# Patient Record
Sex: MALE | Race: WHITE | NOT HISPANIC OR LATINO | Employment: OTHER | URBAN - METROPOLITAN AREA
[De-identification: names, ages, dates, MRNs, and addresses within clinical notes are randomized per-mention and may not be internally consistent; named-entity substitution may affect disease eponyms.]

---

## 2017-09-05 ENCOUNTER — LAB VISIT (OUTPATIENT)
Dept: LAB | Facility: HOSPITAL | Age: 57
End: 2017-09-05
Attending: INTERNAL MEDICINE
Payer: MEDICARE

## 2017-09-05 DIAGNOSIS — M10.9 GOUT, UNSPECIFIED: ICD-10-CM

## 2017-09-05 DIAGNOSIS — E78.00 PURE HYPERCHOLESTEROLEMIA: Primary | ICD-10-CM

## 2017-09-05 DIAGNOSIS — I10 ESSENTIAL HYPERTENSION, MALIGNANT: ICD-10-CM

## 2017-09-05 LAB
ALBUMIN SERPL BCP-MCNC: 3.8 G/DL
ALP SERPL-CCNC: 53 U/L
ALT SERPL W/O P-5'-P-CCNC: 44 U/L
ANION GAP SERPL CALC-SCNC: 7 MMOL/L
AST SERPL-CCNC: 33 U/L
BILIRUB DIRECT SERPL-MCNC: 0.3 MG/DL
BILIRUB SERPL-MCNC: 0.8 MG/DL
BUN SERPL-MCNC: 14 MG/DL
CALCIUM SERPL-MCNC: 9.5 MG/DL
CHLORIDE SERPL-SCNC: 107 MMOL/L
CHOLEST SERPL-MCNC: 162 MG/DL
CHOLEST/HDLC SERPL: 3.1 {RATIO}
CO2 SERPL-SCNC: 26 MMOL/L
CREAT SERPL-MCNC: 1 MG/DL
EST. GFR  (AFRICAN AMERICAN): >60 ML/MIN/1.73 M^2
EST. GFR  (NON AFRICAN AMERICAN): >60 ML/MIN/1.73 M^2
GLUCOSE SERPL-MCNC: 102 MG/DL
HDLC SERPL-MCNC: 52 MG/DL
HDLC SERPL: 32.1 %
LDLC SERPL CALC-MCNC: 71.8 MG/DL
NONHDLC SERPL-MCNC: 110 MG/DL
POTASSIUM SERPL-SCNC: 4.1 MMOL/L
PROT SERPL-MCNC: 7 G/DL
SODIUM SERPL-SCNC: 140 MMOL/L
TRIGL SERPL-MCNC: 191 MG/DL
URATE SERPL-MCNC: 4.7 MG/DL

## 2017-09-05 PROCEDURE — 36415 COLL VENOUS BLD VENIPUNCTURE: CPT

## 2017-09-05 PROCEDURE — 80061 LIPID PANEL: CPT

## 2017-09-05 PROCEDURE — 80048 BASIC METABOLIC PNL TOTAL CA: CPT

## 2017-09-05 PROCEDURE — 84550 ASSAY OF BLOOD/URIC ACID: CPT

## 2017-09-05 PROCEDURE — 80076 HEPATIC FUNCTION PANEL: CPT

## 2017-11-27 ENCOUNTER — LAB VISIT (OUTPATIENT)
Dept: LAB | Facility: HOSPITAL | Age: 57
End: 2017-11-27
Attending: INTERNAL MEDICINE
Payer: MEDICARE

## 2017-11-27 DIAGNOSIS — E78.00 PURE HYPERCHOLESTEROLEMIA: ICD-10-CM

## 2017-11-27 DIAGNOSIS — I10 ESSENTIAL HYPERTENSION, MALIGNANT: Primary | ICD-10-CM

## 2017-11-27 LAB
ANION GAP SERPL CALC-SCNC: 7 MMOL/L
BUN SERPL-MCNC: 15 MG/DL
CALCIUM SERPL-MCNC: 9.4 MG/DL
CHLORIDE SERPL-SCNC: 106 MMOL/L
CO2 SERPL-SCNC: 26 MMOL/L
CREAT SERPL-MCNC: 1 MG/DL
EST. GFR  (AFRICAN AMERICAN): >60 ML/MIN/1.73 M^2
EST. GFR  (NON AFRICAN AMERICAN): >60 ML/MIN/1.73 M^2
GLUCOSE SERPL-MCNC: 101 MG/DL
POTASSIUM SERPL-SCNC: 4 MMOL/L
SODIUM SERPL-SCNC: 139 MMOL/L

## 2017-11-27 PROCEDURE — 36415 COLL VENOUS BLD VENIPUNCTURE: CPT

## 2017-11-27 PROCEDURE — 80048 BASIC METABOLIC PNL TOTAL CA: CPT

## 2017-11-28 ENCOUNTER — OFFICE VISIT (OUTPATIENT)
Dept: OPTOMETRY | Facility: CLINIC | Age: 57
End: 2017-11-28
Payer: MEDICARE

## 2017-11-28 DIAGNOSIS — H52.203 MYOPIA WITH ASTIGMATISM AND PRESBYOPIA, BILATERAL: ICD-10-CM

## 2017-11-28 DIAGNOSIS — H52.4 MYOPIA WITH ASTIGMATISM AND PRESBYOPIA, BILATERAL: ICD-10-CM

## 2017-11-28 DIAGNOSIS — Z01.00 EYE EXAM, ROUTINE: Primary | ICD-10-CM

## 2017-11-28 DIAGNOSIS — H52.13 MYOPIA WITH ASTIGMATISM AND PRESBYOPIA, BILATERAL: ICD-10-CM

## 2017-11-28 DIAGNOSIS — H25.13 NUCLEAR SCLEROSIS, BILATERAL: ICD-10-CM

## 2017-11-28 PROCEDURE — 99999 PR PBB SHADOW E&M-EST. PATIENT-LVL II: CPT | Mod: PBBFAC,,, | Performed by: OPTOMETRIST

## 2017-11-28 PROCEDURE — 92014 COMPRE OPH EXAM EST PT 1/>: CPT | Mod: S$GLB,,, | Performed by: OPTOMETRIST

## 2017-11-28 PROCEDURE — 92015 DETERMINE REFRACTIVE STATE: CPT | Mod: S$GLB,,, | Performed by: OPTOMETRIST

## 2017-11-28 NOTE — PROGRESS NOTES
HPI      is here for annual eye exam. Pt sts change in vision dist/near.      (-)Flashes (-)Floaters  (-)Itch, (+)tear, (-)burn, (-)Dryness. (-) OTC Drops   (-)Photophobia  (-)Glare (-)diplopia (-) headaches          Last edited by Gal Walker, PCT on 11/28/2017  9:02 AM. (History)            Assessment /Plan     For exam results, see Encounter Report.    Eye exam, routine  -annual [facility] exam    Myopia with astigmatism and presbyopia, bilateral  Eyeglass Final Rx     Eyeglass Final Rx       Sphere Cylinder Axis Dist VA Add    Right -1.25 +1.25 160 20/20 +2.25    Left -0.75 +0.50 180 20/20 +2.25    Type:  SVL-Distance/Near    Expiration Date:  11/29/2018                Nuclear sclerosis, bilateral  -Educated patient on presence of cataracts at today's exam, monitor at annual dilated fundus exam. 8+ years surgical estimate.      RTC 1 yr

## 2018-03-21 ENCOUNTER — PES CALL (OUTPATIENT)
Dept: ADMINISTRATIVE | Facility: CLINIC | Age: 58
End: 2018-03-21

## 2018-05-21 ENCOUNTER — LAB VISIT (OUTPATIENT)
Dept: LAB | Facility: HOSPITAL | Age: 58
End: 2018-05-21
Attending: INTERNAL MEDICINE
Payer: MEDICARE

## 2018-05-21 DIAGNOSIS — E78.00 PURE HYPERCHOLESTEROLEMIA: ICD-10-CM

## 2018-05-21 DIAGNOSIS — I10 ESSENTIAL HYPERTENSION, MALIGNANT: Primary | ICD-10-CM

## 2018-05-21 LAB
ALBUMIN SERPL BCP-MCNC: 3.8 G/DL
ALP SERPL-CCNC: 55 U/L
ALT SERPL W/O P-5'-P-CCNC: 29 U/L
ANION GAP SERPL CALC-SCNC: 8 MMOL/L
AST SERPL-CCNC: 27 U/L
BILIRUB DIRECT SERPL-MCNC: 0.2 MG/DL
BILIRUB SERPL-MCNC: 0.6 MG/DL
BUN SERPL-MCNC: 18 MG/DL
CALCIUM SERPL-MCNC: 9.3 MG/DL
CHLORIDE SERPL-SCNC: 105 MMOL/L
CHOLEST SERPL-MCNC: 146 MG/DL
CHOLEST/HDLC SERPL: 3.1 {RATIO}
CO2 SERPL-SCNC: 25 MMOL/L
CREAT SERPL-MCNC: 1.1 MG/DL
EST. GFR  (AFRICAN AMERICAN): >60 ML/MIN/1.73 M^2
EST. GFR  (NON AFRICAN AMERICAN): >60 ML/MIN/1.73 M^2
GLUCOSE SERPL-MCNC: 115 MG/DL
HDLC SERPL-MCNC: 47 MG/DL
HDLC SERPL: 32.2 %
LDLC SERPL CALC-MCNC: 71.8 MG/DL
NONHDLC SERPL-MCNC: 99 MG/DL
POTASSIUM SERPL-SCNC: 4.4 MMOL/L
PROT SERPL-MCNC: 6.5 G/DL
SODIUM SERPL-SCNC: 138 MMOL/L
TRIGL SERPL-MCNC: 136 MG/DL
URATE SERPL-MCNC: 6.5 MG/DL

## 2018-05-21 PROCEDURE — 36415 COLL VENOUS BLD VENIPUNCTURE: CPT

## 2018-05-21 PROCEDURE — 84550 ASSAY OF BLOOD/URIC ACID: CPT

## 2018-05-21 PROCEDURE — 80048 BASIC METABOLIC PNL TOTAL CA: CPT

## 2018-05-21 PROCEDURE — 80076 HEPATIC FUNCTION PANEL: CPT

## 2018-05-21 PROCEDURE — 80061 LIPID PANEL: CPT

## 2018-11-23 RX ORDER — PROMETHAZINE HYDROCHLORIDE AND CODEINE PHOSPHATE 6.25; 1 MG/5ML; MG/5ML
5 SOLUTION ORAL EVERY 4 HOURS PRN
Qty: 30 ML | Refills: 0 | Status: SHIPPED | OUTPATIENT
Start: 2018-11-23 | End: 2018-11-23 | Stop reason: SDUPTHER

## 2018-11-23 RX ORDER — PROMETHAZINE HYDROCHLORIDE AND CODEINE PHOSPHATE 6.25; 1 MG/5ML; MG/5ML
5 SOLUTION ORAL EVERY 4 HOURS PRN
Qty: 30 ML | Refills: 0 | Status: SHIPPED | OUTPATIENT
Start: 2018-11-23 | End: 2018-12-03

## 2018-12-04 ENCOUNTER — OFFICE VISIT (OUTPATIENT)
Dept: OPTOMETRY | Facility: CLINIC | Age: 58
End: 2018-12-04
Payer: MEDICARE

## 2018-12-04 DIAGNOSIS — H52.4 MYOPIA WITH ASTIGMATISM AND PRESBYOPIA, BILATERAL: ICD-10-CM

## 2018-12-04 DIAGNOSIS — H52.203 MYOPIA WITH ASTIGMATISM AND PRESBYOPIA, BILATERAL: ICD-10-CM

## 2018-12-04 DIAGNOSIS — H52.13 MYOPIA WITH ASTIGMATISM AND PRESBYOPIA, BILATERAL: ICD-10-CM

## 2018-12-04 DIAGNOSIS — Z01.00 EYE EXAM, ROUTINE: Primary | ICD-10-CM

## 2018-12-04 PROCEDURE — 92014 COMPRE OPH EXAM EST PT 1/>: CPT | Mod: S$GLB,,, | Performed by: OPTOMETRIST

## 2018-12-04 PROCEDURE — 99999 PR PBB SHADOW E&M-EST. PATIENT-LVL II: CPT | Mod: PBBFAC,,, | Performed by: OPTOMETRIST

## 2018-12-04 PROCEDURE — 92015 DETERMINE REFRACTIVE STATE: CPT | Mod: S$GLB,,, | Performed by: OPTOMETRIST

## 2018-12-04 RX ORDER — OLMESARTAN MEDOXOMIL 40 MG/1
TABLET ORAL
COMMUNITY
Start: 2018-10-09

## 2018-12-04 RX ORDER — ALLOPURINOL 300 MG/1
TABLET ORAL
COMMUNITY
Start: 2018-10-17

## 2018-12-04 NOTE — PROGRESS NOTES
HPI     DSL - 11/28/17     59 y/o male is here for routine eye exam. Pt c/o of blurry distance. Pt   denies floaters, flashes, and eye allergies. Glare is a slight bother. Pt   did not get last MRX filled.     Eyemeds  No gtts    Last edited by Carola Arias on 12/4/2018  9:38 AM. (History)            Assessment /Plan     For exam results, see Encounter Report.    Eye exam, routine  -[facility]    Myopia with astigmatism and presbyopia, bilateral  Eyeglass Final Rx     Eyeglass Final Rx       Sphere Cylinder Axis Dist VA Add    Right -1.25 +1.25 160 20/20 +2.00    Left -0.50 +0.75 005 20/20 +2.00    Type:  SVL    Expiration Date:  12/5/2019                  RTC 1 yr

## 2019-02-21 ENCOUNTER — LAB VISIT (OUTPATIENT)
Dept: LAB | Facility: HOSPITAL | Age: 59
End: 2019-02-21
Attending: INTERNAL MEDICINE
Payer: MEDICARE

## 2019-02-21 DIAGNOSIS — M10.9 GOUT, UNSPECIFIED: ICD-10-CM

## 2019-02-21 DIAGNOSIS — E78.00 PURE HYPERCHOLESTEROLEMIA: ICD-10-CM

## 2019-02-21 DIAGNOSIS — I10 ESSENTIAL HYPERTENSION, MALIGNANT: Primary | ICD-10-CM

## 2019-02-21 LAB
ALBUMIN SERPL BCP-MCNC: 4 G/DL
ALP SERPL-CCNC: 64 U/L
ALT SERPL W/O P-5'-P-CCNC: 70 U/L
ANION GAP SERPL CALC-SCNC: 7 MMOL/L
AST SERPL-CCNC: 49 U/L
BILIRUB DIRECT SERPL-MCNC: 0.3 MG/DL
BILIRUB SERPL-MCNC: 0.6 MG/DL
BUN SERPL-MCNC: 14 MG/DL
CALCIUM SERPL-MCNC: 9.9 MG/DL
CHLORIDE SERPL-SCNC: 106 MMOL/L
CHOLEST SERPL-MCNC: 167 MG/DL
CHOLEST/HDLC SERPL: 3.5 {RATIO}
CO2 SERPL-SCNC: 26 MMOL/L
CREAT SERPL-MCNC: 1 MG/DL
EST. GFR  (AFRICAN AMERICAN): >60 ML/MIN/1.73 M^2
EST. GFR  (NON AFRICAN AMERICAN): >60 ML/MIN/1.73 M^2
GLUCOSE SERPL-MCNC: 131 MG/DL
HDLC SERPL-MCNC: 48 MG/DL
HDLC SERPL: 28.7 %
LDLC SERPL CALC-MCNC: 75.4 MG/DL
NONHDLC SERPL-MCNC: 119 MG/DL
POTASSIUM SERPL-SCNC: 4.3 MMOL/L
PROT SERPL-MCNC: 6.9 G/DL
SODIUM SERPL-SCNC: 139 MMOL/L
TRIGL SERPL-MCNC: 218 MG/DL
URATE SERPL-MCNC: 5.9 MG/DL

## 2019-02-21 PROCEDURE — 84550 ASSAY OF BLOOD/URIC ACID: CPT

## 2019-02-21 PROCEDURE — 80061 LIPID PANEL: CPT

## 2019-02-21 PROCEDURE — 80076 HEPATIC FUNCTION PANEL: CPT

## 2019-02-21 PROCEDURE — 36415 COLL VENOUS BLD VENIPUNCTURE: CPT

## 2019-02-21 PROCEDURE — 80048 BASIC METABOLIC PNL TOTAL CA: CPT

## 2019-06-18 ENCOUNTER — PES CALL (OUTPATIENT)
Dept: ADMINISTRATIVE | Facility: CLINIC | Age: 59
End: 2019-06-18

## 2019-11-05 ENCOUNTER — LAB VISIT (OUTPATIENT)
Dept: LAB | Facility: HOSPITAL | Age: 59
End: 2019-11-05
Attending: INTERNAL MEDICINE
Payer: MEDICARE

## 2019-11-05 DIAGNOSIS — M10.00 GOUTY NEURITIS: ICD-10-CM

## 2019-11-05 DIAGNOSIS — G63 GOUTY NEURITIS: ICD-10-CM

## 2019-11-05 DIAGNOSIS — E78.00 PURE HYPERCHOLESTEROLEMIA: Primary | ICD-10-CM

## 2019-11-05 LAB
ALBUMIN SERPL BCP-MCNC: 4.3 G/DL (ref 3.5–5.2)
ALP SERPL-CCNC: 60 U/L (ref 55–135)
ALT SERPL W/O P-5'-P-CCNC: 38 U/L (ref 10–44)
ANION GAP SERPL CALC-SCNC: 4 MMOL/L (ref 8–16)
AST SERPL-CCNC: 38 U/L (ref 10–40)
BILIRUB DIRECT SERPL-MCNC: 0.2 MG/DL (ref 0.1–0.3)
BILIRUB SERPL-MCNC: 0.6 MG/DL (ref 0.1–1)
BUN SERPL-MCNC: 17 MG/DL (ref 8–23)
CALCIUM SERPL-MCNC: 11 MG/DL (ref 8.7–10.5)
CHLORIDE SERPL-SCNC: 109 MMOL/L (ref 95–110)
CHOLEST SERPL-MCNC: 175 MG/DL (ref 120–199)
CHOLEST/HDLC SERPL: 3.4 {RATIO} (ref 2–5)
CO2 SERPL-SCNC: 27 MMOL/L (ref 23–29)
CREAT SERPL-MCNC: 1 MG/DL (ref 0.5–1.4)
EST. GFR  (AFRICAN AMERICAN): >60 ML/MIN/1.73 M^2
EST. GFR  (NON AFRICAN AMERICAN): >60 ML/MIN/1.73 M^2
GLUCOSE SERPL-MCNC: 105 MG/DL (ref 70–110)
HDLC SERPL-MCNC: 52 MG/DL (ref 40–75)
HDLC SERPL: 29.7 % (ref 20–50)
LDLC SERPL CALC-MCNC: 99.2 MG/DL (ref 63–159)
NONHDLC SERPL-MCNC: 123 MG/DL
POTASSIUM SERPL-SCNC: 4.5 MMOL/L (ref 3.5–5.1)
PROT SERPL-MCNC: 7.4 G/DL (ref 6–8.4)
SODIUM SERPL-SCNC: 140 MMOL/L (ref 136–145)
TRIGL SERPL-MCNC: 119 MG/DL (ref 30–150)

## 2019-11-05 PROCEDURE — 36415 COLL VENOUS BLD VENIPUNCTURE: CPT | Mod: PO

## 2019-11-05 PROCEDURE — 80061 LIPID PANEL: CPT

## 2019-11-05 PROCEDURE — 80048 BASIC METABOLIC PNL TOTAL CA: CPT

## 2019-11-05 PROCEDURE — 80076 HEPATIC FUNCTION PANEL: CPT

## 2019-12-17 ENCOUNTER — OFFICE VISIT (OUTPATIENT)
Dept: OPTOMETRY | Facility: CLINIC | Age: 59
End: 2019-12-17
Payer: COMMERCIAL

## 2019-12-17 DIAGNOSIS — Z01.00 EYE EXAM, ROUTINE: Primary | ICD-10-CM

## 2019-12-17 DIAGNOSIS — H52.13 MYOPIA WITH ASTIGMATISM AND PRESBYOPIA, BILATERAL: ICD-10-CM

## 2019-12-17 DIAGNOSIS — H52.203 MYOPIA WITH ASTIGMATISM AND PRESBYOPIA, BILATERAL: ICD-10-CM

## 2019-12-17 DIAGNOSIS — H52.4 MYOPIA WITH ASTIGMATISM AND PRESBYOPIA, BILATERAL: ICD-10-CM

## 2019-12-17 PROCEDURE — 99999 PR PBB SHADOW E&M-EST. PATIENT-LVL II: CPT | Mod: PBBFAC,HCNC,, | Performed by: OPTOMETRIST

## 2019-12-17 PROCEDURE — 92015 DETERMINE REFRACTIVE STATE: CPT | Mod: HCNC,S$GLB,, | Performed by: OPTOMETRIST

## 2019-12-17 PROCEDURE — 92014 COMPRE OPH EXAM EST PT 1/>: CPT | Mod: HCNC,S$GLB,, | Performed by: OPTOMETRIST

## 2019-12-17 PROCEDURE — 92014 PR EYE EXAM, EST PATIENT,COMPREHESV: ICD-10-PCS | Mod: HCNC,S$GLB,, | Performed by: OPTOMETRIST

## 2019-12-17 PROCEDURE — 99999 PR PBB SHADOW E&M-EST. PATIENT-LVL II: ICD-10-PCS | Mod: PBBFAC,HCNC,, | Performed by: OPTOMETRIST

## 2019-12-17 PROCEDURE — 92015 PR REFRACTION: ICD-10-PCS | Mod: HCNC,S$GLB,, | Performed by: OPTOMETRIST

## 2019-12-17 NOTE — PROGRESS NOTES
HPI     DSL- 12/4/18   [facility]    58 y/o male is here for routine eye exam. Pt states no Va change. Pt   denies eye allergies, floaters, and flashes.     Eyemeds  No gtts    Last edited by Eliu Mancini, OD on 12/17/2019  8:53 AM. (History)            Assessment /Plan     For exam results, see Encounter Report.    Eye exam, routine  -[facility]  -Educated patient on presence of cataracts at today's exam, monitor at annual dilated fundus exam. 8+ years surgical estimate.    Myopia with astigmatism and presbyopia, bilateral  Eyeglass Final Rx     Eyeglass Final Rx       Sphere Cylinder Axis Dist VA Add    Right -1.50 +1.25 160 20/20 +2.25    Left -0.50 +0.75 005 20/20 +2.25    Type:  SVL    Expiration Date:  12/17/2020                  RTC 1 yr

## 2020-05-10 LAB — FECAL GLOBIN BY IMMUNOCHEM. (MEDICARE): NEGATIVE

## 2020-06-17 ENCOUNTER — PATIENT OUTREACH (OUTPATIENT)
Dept: ADMINISTRATIVE | Facility: HOSPITAL | Age: 60
End: 2020-06-17

## 2020-10-21 ENCOUNTER — LAB VISIT (OUTPATIENT)
Dept: LAB | Facility: HOSPITAL | Age: 60
End: 2020-10-21
Attending: INTERNAL MEDICINE
Payer: MEDICARE

## 2020-10-21 DIAGNOSIS — I10 BENIGN HYPERTENSION: ICD-10-CM

## 2020-10-21 DIAGNOSIS — E78.00 PURE HYPERCHOLESTEROLEMIA: ICD-10-CM

## 2020-10-21 LAB
ALBUMIN SERPL BCP-MCNC: 4.2 G/DL (ref 3.5–5.2)
ALP SERPL-CCNC: 46 U/L (ref 55–135)
ALT SERPL W/O P-5'-P-CCNC: 39 U/L (ref 10–44)
ANION GAP SERPL CALC-SCNC: 8 MMOL/L (ref 8–16)
AST SERPL-CCNC: 40 U/L (ref 10–40)
BILIRUB DIRECT SERPL-MCNC: 0.2 MG/DL (ref 0.1–0.3)
BILIRUB SERPL-MCNC: 0.5 MG/DL (ref 0.1–1)
BUN SERPL-MCNC: 14 MG/DL (ref 8–23)
CALCIUM SERPL-MCNC: 8.9 MG/DL (ref 8.7–10.5)
CHLORIDE SERPL-SCNC: 109 MMOL/L (ref 95–110)
CHOLEST SERPL-MCNC: 146 MG/DL (ref 120–199)
CHOLEST/HDLC SERPL: 2.8 {RATIO} (ref 2–5)
CO2 SERPL-SCNC: 25 MMOL/L (ref 23–29)
CREAT SERPL-MCNC: 0.9 MG/DL (ref 0.5–1.4)
EST. GFR  (AFRICAN AMERICAN): >60 ML/MIN/1.73 M^2
EST. GFR  (NON AFRICAN AMERICAN): >60 ML/MIN/1.73 M^2
GLUCOSE SERPL-MCNC: 94 MG/DL (ref 70–110)
HDLC SERPL-MCNC: 53 MG/DL (ref 40–75)
HDLC SERPL: 36.3 % (ref 20–50)
LDLC SERPL CALC-MCNC: 73 MG/DL (ref 63–159)
NONHDLC SERPL-MCNC: 93 MG/DL
POTASSIUM SERPL-SCNC: 4.3 MMOL/L (ref 3.5–5.1)
PROT SERPL-MCNC: 6.8 G/DL (ref 6–8.4)
SODIUM SERPL-SCNC: 142 MMOL/L (ref 136–145)
TRIGL SERPL-MCNC: 100 MG/DL (ref 30–150)

## 2020-10-21 PROCEDURE — 80048 BASIC METABOLIC PNL TOTAL CA: CPT | Mod: HCNC

## 2020-10-21 PROCEDURE — 80061 LIPID PANEL: CPT | Mod: HCNC

## 2020-10-21 PROCEDURE — 80076 HEPATIC FUNCTION PANEL: CPT | Mod: HCNC

## 2020-10-21 PROCEDURE — 36415 COLL VENOUS BLD VENIPUNCTURE: CPT | Mod: HCNC,PO

## 2020-11-13 ENCOUNTER — PES CALL (OUTPATIENT)
Dept: ADMINISTRATIVE | Facility: CLINIC | Age: 60
End: 2020-11-13

## 2020-11-27 ENCOUNTER — PES CALL (OUTPATIENT)
Dept: ADMINISTRATIVE | Facility: CLINIC | Age: 60
End: 2020-11-27

## 2021-01-29 ENCOUNTER — LAB VISIT (OUTPATIENT)
Dept: LAB | Facility: HOSPITAL | Age: 61
End: 2021-01-29
Attending: UROLOGY
Payer: MEDICARE

## 2021-01-29 DIAGNOSIS — Z12.5 SCREENING FOR PROSTATE CANCER: ICD-10-CM

## 2021-01-29 LAB
PROSTATE SPECIFIC ANTIGEN, TOTAL: 0.65 NG/ML (ref 0–4)
PSA FREE MFR SERPL: 30.77 %
PSA FREE SERPL-MCNC: 0.2 NG/ML (ref 0.01–1.5)

## 2021-01-29 PROCEDURE — 36415 COLL VENOUS BLD VENIPUNCTURE: CPT | Mod: PO

## 2021-01-29 PROCEDURE — 84153 ASSAY OF PSA TOTAL: CPT

## 2021-01-29 PROCEDURE — 84154 ASSAY OF PSA FREE: CPT

## 2021-03-16 ENCOUNTER — OFFICE VISIT (OUTPATIENT)
Dept: OPTOMETRY | Facility: CLINIC | Age: 61
End: 2021-03-16
Payer: MEDICARE

## 2021-03-16 DIAGNOSIS — H52.13 MYOPIA WITH ASTIGMATISM AND PRESBYOPIA, BILATERAL: ICD-10-CM

## 2021-03-16 DIAGNOSIS — H52.203 MYOPIA WITH ASTIGMATISM AND PRESBYOPIA, BILATERAL: ICD-10-CM

## 2021-03-16 DIAGNOSIS — Z01.00 EYE EXAM, ROUTINE: Primary | ICD-10-CM

## 2021-03-16 DIAGNOSIS — H52.4 MYOPIA WITH ASTIGMATISM AND PRESBYOPIA, BILATERAL: ICD-10-CM

## 2021-03-16 PROCEDURE — 99999 PR PBB SHADOW E&M-EST. PATIENT-LVL III: ICD-10-PCS | Mod: PBBFAC,,, | Performed by: OPTOMETRIST

## 2021-03-16 PROCEDURE — 92015 DETERMINE REFRACTIVE STATE: CPT | Mod: S$GLB,,, | Performed by: OPTOMETRIST

## 2021-03-16 PROCEDURE — 92014 PR EYE EXAM, EST PATIENT,COMPREHESV: ICD-10-PCS | Mod: S$GLB,,, | Performed by: OPTOMETRIST

## 2021-03-16 PROCEDURE — 92015 PR REFRACTION: ICD-10-PCS | Mod: S$GLB,,, | Performed by: OPTOMETRIST

## 2021-03-16 PROCEDURE — 1126F AMNT PAIN NOTED NONE PRSNT: CPT | Mod: S$GLB,,, | Performed by: OPTOMETRIST

## 2021-03-16 PROCEDURE — 99999 PR PBB SHADOW E&M-EST. PATIENT-LVL III: CPT | Mod: PBBFAC,,, | Performed by: OPTOMETRIST

## 2021-03-16 PROCEDURE — 92014 COMPRE OPH EXAM EST PT 1/>: CPT | Mod: S$GLB,,, | Performed by: OPTOMETRIST

## 2021-03-16 PROCEDURE — 1126F PR PAIN SEVERITY QUANTIFIED, NO PAIN PRESENT: ICD-10-PCS | Mod: S$GLB,,, | Performed by: OPTOMETRIST

## 2021-05-25 ENCOUNTER — LAB VISIT (OUTPATIENT)
Dept: LAB | Facility: HOSPITAL | Age: 61
End: 2021-05-25
Attending: INTERNAL MEDICINE
Payer: MEDICARE

## 2021-05-25 DIAGNOSIS — I10 ESSENTIAL HYPERTENSION, BENIGN: ICD-10-CM

## 2021-05-25 DIAGNOSIS — E78.00 PURE HYPERCHOLESTEROLEMIA: ICD-10-CM

## 2021-05-25 LAB
ALBUMIN SERPL BCP-MCNC: 4.1 G/DL (ref 3.5–5.2)
ALP SERPL-CCNC: 56 U/L (ref 55–135)
ALT SERPL W/O P-5'-P-CCNC: 40 U/L (ref 10–44)
AST SERPL-CCNC: 40 U/L (ref 10–40)
BILIRUB DIRECT SERPL-MCNC: 0.3 MG/DL (ref 0.1–0.3)
BILIRUB SERPL-MCNC: 0.7 MG/DL (ref 0.1–1)
CHOLEST SERPL-MCNC: 149 MG/DL (ref 120–199)
CHOLEST/HDLC SERPL: 2.9 {RATIO} (ref 2–5)
HDLC SERPL-MCNC: 52 MG/DL (ref 40–75)
HDLC SERPL: 34.9 % (ref 20–50)
LDLC SERPL CALC-MCNC: 64.6 MG/DL (ref 63–159)
NONHDLC SERPL-MCNC: 97 MG/DL
PROT SERPL-MCNC: 7.2 G/DL (ref 6–8.4)
TRIGL SERPL-MCNC: 162 MG/DL (ref 30–150)

## 2021-05-25 PROCEDURE — 80048 BASIC METABOLIC PNL TOTAL CA: CPT | Performed by: INTERNAL MEDICINE

## 2021-05-25 PROCEDURE — 36415 COLL VENOUS BLD VENIPUNCTURE: CPT | Mod: PO | Performed by: INTERNAL MEDICINE

## 2021-05-25 PROCEDURE — 80061 LIPID PANEL: CPT | Performed by: INTERNAL MEDICINE

## 2021-05-25 PROCEDURE — 80076 HEPATIC FUNCTION PANEL: CPT | Performed by: INTERNAL MEDICINE

## 2021-05-26 LAB
ANION GAP SERPL CALC-SCNC: 9 MMOL/L (ref 8–16)
BUN SERPL-MCNC: 13 MG/DL (ref 8–23)
CALCIUM SERPL-MCNC: 9.6 MG/DL (ref 8.7–10.5)
CHLORIDE SERPL-SCNC: 106 MMOL/L (ref 95–110)
CO2 SERPL-SCNC: 22 MMOL/L (ref 23–29)
CREAT SERPL-MCNC: 1 MG/DL (ref 0.5–1.4)
EST. GFR  (AFRICAN AMERICAN): >60 ML/MIN/1.73 M^2
EST. GFR  (NON AFRICAN AMERICAN): >60 ML/MIN/1.73 M^2
GLUCOSE SERPL-MCNC: 92 MG/DL (ref 70–110)
POTASSIUM SERPL-SCNC: 4.7 MMOL/L (ref 3.5–5.1)
SODIUM SERPL-SCNC: 137 MMOL/L (ref 136–145)

## 2021-12-01 ENCOUNTER — LAB VISIT (OUTPATIENT)
Dept: LAB | Facility: HOSPITAL | Age: 61
End: 2021-12-01
Attending: INTERNAL MEDICINE
Payer: MEDICARE

## 2021-12-01 DIAGNOSIS — E78.00 PURE HYPERCHOLESTEROLEMIA: ICD-10-CM

## 2021-12-01 DIAGNOSIS — I10 ESSENTIAL HYPERTENSION, BENIGN: ICD-10-CM

## 2021-12-01 LAB
ALBUMIN SERPL BCP-MCNC: 4.2 G/DL (ref 3.5–5.2)
ALP SERPL-CCNC: 54 U/L (ref 55–135)
ALT SERPL W/O P-5'-P-CCNC: 43 U/L (ref 10–44)
ANION GAP SERPL CALC-SCNC: 10 MMOL/L (ref 8–16)
AST SERPL-CCNC: 42 U/L (ref 10–40)
BILIRUB DIRECT SERPL-MCNC: 0.3 MG/DL (ref 0.1–0.3)
BILIRUB SERPL-MCNC: 0.7 MG/DL (ref 0.1–1)
BUN SERPL-MCNC: 15 MG/DL (ref 8–23)
CALCIUM SERPL-MCNC: 9.6 MG/DL (ref 8.7–10.5)
CHLORIDE SERPL-SCNC: 107 MMOL/L (ref 95–110)
CHOLEST SERPL-MCNC: 136 MG/DL (ref 120–199)
CHOLEST/HDLC SERPL: 3.3 {RATIO} (ref 2–5)
CO2 SERPL-SCNC: 23 MMOL/L (ref 23–29)
CREAT SERPL-MCNC: 0.9 MG/DL (ref 0.5–1.4)
EST. GFR  (AFRICAN AMERICAN): >60 ML/MIN/1.73 M^2
EST. GFR  (NON AFRICAN AMERICAN): >60 ML/MIN/1.73 M^2
GLUCOSE SERPL-MCNC: 89 MG/DL (ref 70–110)
HDLC SERPL-MCNC: 41 MG/DL (ref 40–75)
HDLC SERPL: 30.1 % (ref 20–50)
LDLC SERPL CALC-MCNC: 68.4 MG/DL (ref 63–159)
NONHDLC SERPL-MCNC: 95 MG/DL
POTASSIUM SERPL-SCNC: 4.2 MMOL/L (ref 3.5–5.1)
PROT SERPL-MCNC: 7.1 G/DL (ref 6–8.4)
SODIUM SERPL-SCNC: 140 MMOL/L (ref 136–145)
TRIGL SERPL-MCNC: 133 MG/DL (ref 30–150)

## 2021-12-01 PROCEDURE — 80061 LIPID PANEL: CPT | Mod: HCNC | Performed by: INTERNAL MEDICINE

## 2021-12-01 PROCEDURE — 80076 HEPATIC FUNCTION PANEL: CPT | Mod: HCNC | Performed by: INTERNAL MEDICINE

## 2021-12-01 PROCEDURE — 80048 BASIC METABOLIC PNL TOTAL CA: CPT | Mod: HCNC | Performed by: INTERNAL MEDICINE

## 2021-12-01 PROCEDURE — 36415 COLL VENOUS BLD VENIPUNCTURE: CPT | Mod: HCNC,PO | Performed by: INTERNAL MEDICINE

## 2022-05-25 ENCOUNTER — PATIENT OUTREACH (OUTPATIENT)
Dept: ADMINISTRATIVE | Facility: HOSPITAL | Age: 62
End: 2022-05-25
Payer: MEDICARE

## 2022-06-02 ENCOUNTER — LAB VISIT (OUTPATIENT)
Dept: LAB | Facility: HOSPITAL | Age: 62
End: 2022-06-02
Attending: INTERNAL MEDICINE
Payer: MEDICARE

## 2022-06-02 DIAGNOSIS — I10 HYPERTENSION, ESSENTIAL: ICD-10-CM

## 2022-06-02 DIAGNOSIS — E78.00 PURE HYPERCHOLESTEROLEMIA: ICD-10-CM

## 2022-06-02 LAB
ALBUMIN SERPL BCP-MCNC: 4.2 G/DL (ref 3.5–5.2)
ALP SERPL-CCNC: 60 U/L (ref 55–135)
ALT SERPL W/O P-5'-P-CCNC: 39 U/L (ref 10–44)
ANION GAP SERPL CALC-SCNC: 8 MMOL/L (ref 8–16)
AST SERPL-CCNC: 32 U/L (ref 10–40)
BILIRUB DIRECT SERPL-MCNC: 0.3 MG/DL (ref 0.1–0.3)
BILIRUB SERPL-MCNC: 0.7 MG/DL (ref 0.1–1)
BUN SERPL-MCNC: 11 MG/DL (ref 8–23)
CALCIUM SERPL-MCNC: 9.9 MG/DL (ref 8.7–10.5)
CHLORIDE SERPL-SCNC: 104 MMOL/L (ref 95–110)
CHOLEST SERPL-MCNC: 170 MG/DL (ref 120–199)
CHOLEST/HDLC SERPL: 2.8 {RATIO} (ref 2–5)
CO2 SERPL-SCNC: 27 MMOL/L (ref 23–29)
CREAT SERPL-MCNC: 0.9 MG/DL (ref 0.5–1.4)
EST. GFR  (AFRICAN AMERICAN): >60 ML/MIN/1.73 M^2
EST. GFR  (NON AFRICAN AMERICAN): >60 ML/MIN/1.73 M^2
GLUCOSE SERPL-MCNC: 91 MG/DL (ref 70–110)
HDLC SERPL-MCNC: 60 MG/DL (ref 40–75)
HDLC SERPL: 35.3 % (ref 20–50)
LDLC SERPL CALC-MCNC: 78.8 MG/DL (ref 63–159)
NONHDLC SERPL-MCNC: 110 MG/DL
POTASSIUM SERPL-SCNC: 4.2 MMOL/L (ref 3.5–5.1)
PROT SERPL-MCNC: 7.4 G/DL (ref 6–8.4)
SODIUM SERPL-SCNC: 139 MMOL/L (ref 136–145)
TRIGL SERPL-MCNC: 156 MG/DL (ref 30–150)

## 2022-06-02 PROCEDURE — 80076 HEPATIC FUNCTION PANEL: CPT | Performed by: INTERNAL MEDICINE

## 2022-06-02 PROCEDURE — 36415 COLL VENOUS BLD VENIPUNCTURE: CPT | Mod: PO | Performed by: INTERNAL MEDICINE

## 2022-06-02 PROCEDURE — 80061 LIPID PANEL: CPT | Performed by: INTERNAL MEDICINE

## 2022-06-02 PROCEDURE — 80048 BASIC METABOLIC PNL TOTAL CA: CPT | Performed by: INTERNAL MEDICINE

## 2022-07-12 ENCOUNTER — OFFICE VISIT (OUTPATIENT)
Dept: OPTOMETRY | Facility: CLINIC | Age: 62
End: 2022-07-12
Payer: COMMERCIAL

## 2022-07-12 DIAGNOSIS — H52.203 MYOPIA WITH ASTIGMATISM AND PRESBYOPIA, BILATERAL: ICD-10-CM

## 2022-07-12 DIAGNOSIS — H52.4 MYOPIA WITH ASTIGMATISM AND PRESBYOPIA, BILATERAL: ICD-10-CM

## 2022-07-12 DIAGNOSIS — H52.13 MYOPIA WITH ASTIGMATISM AND PRESBYOPIA, BILATERAL: ICD-10-CM

## 2022-07-12 DIAGNOSIS — Z01.00 EYE EXAM, ROUTINE: Primary | ICD-10-CM

## 2022-07-12 PROCEDURE — 92015 PR REFRACTION: ICD-10-PCS | Mod: S$GLB,,, | Performed by: OPTOMETRIST

## 2022-07-12 PROCEDURE — 99999 PR PBB SHADOW E&M-EST. PATIENT-LVL III: ICD-10-PCS | Mod: PBBFAC,,, | Performed by: OPTOMETRIST

## 2022-07-12 PROCEDURE — 99999 PR PBB SHADOW E&M-EST. PATIENT-LVL III: CPT | Mod: PBBFAC,,, | Performed by: OPTOMETRIST

## 2022-07-12 PROCEDURE — 92015 DETERMINE REFRACTIVE STATE: CPT | Mod: S$GLB,,, | Performed by: OPTOMETRIST

## 2022-07-12 PROCEDURE — 92014 PR EYE EXAM, EST PATIENT,COMPREHESV: ICD-10-PCS | Mod: S$GLB,,, | Performed by: OPTOMETRIST

## 2022-07-12 PROCEDURE — 92014 COMPRE OPH EXAM EST PT 1/>: CPT | Mod: S$GLB,,, | Performed by: OPTOMETRIST

## 2022-07-12 NOTE — PROGRESS NOTES
HPI     63 y/o here for routine eye exam.  Pt states VA distance is blurry.      No tearing  No itching  No burning  No ha's   No flashes  No floaters      Eye meds:NA    Last edited by Ilene Ferguson on 7/12/2022 10:47 AM. (History)            Assessment /Plan     For exam results, see Encounter Report.    Eye exam, routine  -[facility]    Myopia with astigmatism and presbyopia, bilateral  Eyeglass Final Rx     Eyeglass Final Rx       Sphere Cylinder Axis Dist VA    Right -1.75 +1.00 160 20/20    Left -0.75 +0.50 005 20/20    Type: Distance    Expiration Date: 7/12/2023          Eyeglass Final Rx #2       Sphere Cylinder [place] Dist VA    Right +1.00 +1.00 160     Left +1.25 +0.50 005     Type: near    Expiration Date: 7/12/2023                  RTC 1 yr

## 2022-09-28 LAB — HEMOCCULT STL QL IA: NEGATIVE

## 2022-11-28 ENCOUNTER — PATIENT OUTREACH (OUTPATIENT)
Dept: ADMINISTRATIVE | Facility: HOSPITAL | Age: 62
End: 2022-11-28
Payer: MEDICARE

## 2022-12-16 ENCOUNTER — PATIENT OUTREACH (OUTPATIENT)
Dept: ADMINISTRATIVE | Facility: HOSPITAL | Age: 62
End: 2022-12-16
Payer: MEDICARE

## 2023-03-01 ENCOUNTER — LAB VISIT (OUTPATIENT)
Dept: LAB | Facility: HOSPITAL | Age: 63
End: 2023-03-01
Attending: INTERNAL MEDICINE
Payer: MEDICARE

## 2023-03-01 DIAGNOSIS — I10 ESSENTIAL HYPERTENSION, BENIGN: ICD-10-CM

## 2023-03-01 DIAGNOSIS — E78.2 MIXED HYPERLIPIDEMIA: ICD-10-CM

## 2023-03-01 LAB
ALBUMIN SERPL BCP-MCNC: 4.2 G/DL (ref 3.5–5.2)
ALP SERPL-CCNC: 60 U/L (ref 55–135)
ALT SERPL W/O P-5'-P-CCNC: 41 U/L (ref 10–44)
ANION GAP SERPL CALC-SCNC: 8 MMOL/L (ref 8–16)
AST SERPL-CCNC: 43 U/L (ref 10–40)
BILIRUB DIRECT SERPL-MCNC: 0.4 MG/DL (ref 0.1–0.3)
BILIRUB SERPL-MCNC: 1 MG/DL (ref 0.1–1)
BUN SERPL-MCNC: 15 MG/DL (ref 8–23)
CALCIUM SERPL-MCNC: 9.8 MG/DL (ref 8.7–10.5)
CHLORIDE SERPL-SCNC: 107 MMOL/L (ref 95–110)
CHOLEST SERPL-MCNC: 149 MG/DL (ref 120–199)
CHOLEST/HDLC SERPL: 2.8 {RATIO} (ref 2–5)
CO2 SERPL-SCNC: 26 MMOL/L (ref 23–29)
CREAT SERPL-MCNC: 0.9 MG/DL (ref 0.5–1.4)
EST. GFR  (NO RACE VARIABLE): >60 ML/MIN/1.73 M^2
GLUCOSE SERPL-MCNC: 103 MG/DL (ref 70–110)
HDLC SERPL-MCNC: 53 MG/DL (ref 40–75)
HDLC SERPL: 35.6 % (ref 20–50)
LDLC SERPL CALC-MCNC: 75.4 MG/DL (ref 63–159)
NONHDLC SERPL-MCNC: 96 MG/DL
POTASSIUM SERPL-SCNC: 4.6 MMOL/L (ref 3.5–5.1)
PROT SERPL-MCNC: 7.2 G/DL (ref 6–8.4)
SODIUM SERPL-SCNC: 141 MMOL/L (ref 136–145)
TRIGL SERPL-MCNC: 103 MG/DL (ref 30–150)

## 2023-03-01 PROCEDURE — 80048 BASIC METABOLIC PNL TOTAL CA: CPT | Mod: HCNC | Performed by: INTERNAL MEDICINE

## 2023-03-01 PROCEDURE — 36415 COLL VENOUS BLD VENIPUNCTURE: CPT | Mod: HCNC,PO | Performed by: INTERNAL MEDICINE

## 2023-03-01 PROCEDURE — 80061 LIPID PANEL: CPT | Mod: HCNC | Performed by: INTERNAL MEDICINE

## 2023-03-01 PROCEDURE — 80076 HEPATIC FUNCTION PANEL: CPT | Mod: HCNC | Performed by: INTERNAL MEDICINE

## 2023-05-05 ENCOUNTER — PES CALL (OUTPATIENT)
Dept: ADMINISTRATIVE | Facility: CLINIC | Age: 63
End: 2023-05-05
Payer: MEDICARE

## 2023-08-07 ENCOUNTER — TELEPHONE (OUTPATIENT)
Dept: OPTOMETRY | Facility: CLINIC | Age: 63
End: 2023-08-07
Payer: MEDICARE

## 2023-08-07 NOTE — TELEPHONE ENCOUNTER
----- Message from Bridget Montoya sent at 8/7/2023 11:14 AM CDT -----  Contact: pt @ [phone]  Please extend rx  ----- Message -----  From: Felicity Ledesma  Sent: 8/7/2023  10:57 AM CDT  To: [facility] Staff    Reza Gonsalves calling regarding Appointment Access  (message) for #pt is calling to get routine eye exam pt will like to be seen at [facility] pt glasses is broken. Asking for call back

## 2023-08-07 NOTE — TELEPHONE ENCOUNTER
Extended 2/2 limited exam availability  Eyeglass Final Rx       Eyeglass Final Rx         Sphere Cylinder Axis    Right -1.75 +1.00 160    Left -0.75 +0.50 005      Type: Distance    Expiration Date: 8/7/2024              Eyeglass Final Rx #2         Sphere Cylinder Axis    Right +1.00 +1.00 160    Left +1.25 +0.50 005      Type: near    Expiration Date: 8/7/2024

## 2023-10-24 ENCOUNTER — LAB VISIT (OUTPATIENT)
Dept: LAB | Facility: HOSPITAL | Age: 63
End: 2023-10-24
Payer: MEDICARE

## 2023-10-24 DIAGNOSIS — I10 BENIGN HYPERTENSION: ICD-10-CM

## 2023-10-24 DIAGNOSIS — I49.3 VENTRICULAR PREMATURE BEATS: ICD-10-CM

## 2023-10-24 DIAGNOSIS — E78.2 MIXED HYPERLIPIDEMIA: ICD-10-CM

## 2023-10-24 LAB
ALBUMIN SERPL BCP-MCNC: 4 G/DL (ref 3.5–5.2)
ALP SERPL-CCNC: 52 U/L (ref 55–135)
ALT SERPL W/O P-5'-P-CCNC: 48 U/L (ref 10–44)
ANION GAP SERPL CALC-SCNC: 9 MMOL/L (ref 8–16)
AST SERPL-CCNC: 49 U/L (ref 10–40)
BILIRUB DIRECT SERPL-MCNC: 0.2 MG/DL (ref 0.1–0.3)
BILIRUB SERPL-MCNC: 0.6 MG/DL (ref 0.1–1)
BUN SERPL-MCNC: 13 MG/DL (ref 8–23)
CALCIUM SERPL-MCNC: 9.2 MG/DL (ref 8.7–10.5)
CHLORIDE SERPL-SCNC: 110 MMOL/L (ref 95–110)
CHOLEST SERPL-MCNC: 138 MG/DL (ref 120–199)
CHOLEST/HDLC SERPL: 3 {RATIO} (ref 2–5)
CO2 SERPL-SCNC: 23 MMOL/L (ref 23–29)
CREAT SERPL-MCNC: 0.9 MG/DL (ref 0.5–1.4)
EST. GFR  (NO RACE VARIABLE): >60 ML/MIN/1.73 M^2
GLUCOSE SERPL-MCNC: 93 MG/DL (ref 70–110)
HDLC SERPL-MCNC: 46 MG/DL (ref 40–75)
HDLC SERPL: 33.3 % (ref 20–50)
LDLC SERPL CALC-MCNC: 60.6 MG/DL (ref 63–159)
NONHDLC SERPL-MCNC: 92 MG/DL
POTASSIUM SERPL-SCNC: 4.3 MMOL/L (ref 3.5–5.1)
PROT SERPL-MCNC: 6.9 G/DL (ref 6–8.4)
SODIUM SERPL-SCNC: 142 MMOL/L (ref 136–145)
TRIGL SERPL-MCNC: 157 MG/DL (ref 30–150)

## 2023-10-24 PROCEDURE — 80076 HEPATIC FUNCTION PANEL: CPT | Mod: HCNC | Performed by: INTERNAL MEDICINE

## 2023-10-24 PROCEDURE — 80061 LIPID PANEL: CPT | Mod: HCNC | Performed by: INTERNAL MEDICINE

## 2023-10-24 PROCEDURE — 36415 COLL VENOUS BLD VENIPUNCTURE: CPT | Mod: HCNC,PO | Performed by: INTERNAL MEDICINE

## 2023-10-24 PROCEDURE — 80048 BASIC METABOLIC PNL TOTAL CA: CPT | Mod: HCNC | Performed by: INTERNAL MEDICINE

## 2024-01-16 ENCOUNTER — OFFICE VISIT (OUTPATIENT)
Dept: OPTOMETRY | Facility: CLINIC | Age: 64
End: 2024-01-16
Payer: MEDICARE

## 2024-01-16 DIAGNOSIS — H52.203 MYOPIA WITH ASTIGMATISM AND PRESBYOPIA, BILATERAL: ICD-10-CM

## 2024-01-16 DIAGNOSIS — Z01.00 EYE EXAM, ROUTINE: Primary | ICD-10-CM

## 2024-01-16 DIAGNOSIS — H52.13 MYOPIA WITH ASTIGMATISM AND PRESBYOPIA, BILATERAL: ICD-10-CM

## 2024-01-16 DIAGNOSIS — H52.4 MYOPIA WITH ASTIGMATISM AND PRESBYOPIA, BILATERAL: ICD-10-CM

## 2024-01-16 PROCEDURE — 92014 COMPRE OPH EXAM EST PT 1/>: CPT | Mod: HCNC,S$GLB,, | Performed by: OPTOMETRIST

## 2024-01-16 PROCEDURE — 99999 PR PBB SHADOW E&M-EST. PATIENT-LVL III: CPT | Mod: PBBFAC,HCNC,, | Performed by: OPTOMETRIST

## 2024-01-16 PROCEDURE — 92015 DETERMINE REFRACTIVE STATE: CPT | Mod: HCNC,S$GLB,, | Performed by: OPTOMETRIST

## 2024-01-16 NOTE — PROGRESS NOTES
HPI    Annual Exam   Pt states vision could be better c specs   Pt would like 2 rxs Distance and Near     Pt denies F/F   Pt denies Dry/ Itchy/ Burning  Gtt: No    Last edited by Bridget Montoya on 1/16/2024  8:04 AM.            Assessment /Plan     For exam results, see Encounter Report.    Eye exam, routine  -[facility]    Myopia with astigmatism and presbyopia, bilateral  Eyeglass Final Rx       Eyeglass Final Rx         Sphere Cylinder [place] Dist VA    Right -1.25 +1.25 160 20/20    Left -0.25 +0.75 005 20/20      Type: Distance    Expiration Date: 1/16/2025              Eyeglass Final Rx #2         Sphere Cylinder Axis Dist VA    Right +1.50 +1.25 160     Left +1.75 +0.75 005       Type: Near    Expiration Date: 1/16/2025                      RTC 1 yr

## 2024-05-16 ENCOUNTER — LAB VISIT (OUTPATIENT)
Dept: LAB | Facility: HOSPITAL | Age: 64
End: 2024-05-16
Payer: MEDICARE

## 2024-05-16 DIAGNOSIS — I10 ESSENTIAL HYPERTENSION, BENIGN: ICD-10-CM

## 2024-05-16 DIAGNOSIS — E78.00 PURE HYPERCHOLESTEROLEMIA: ICD-10-CM

## 2024-05-16 DIAGNOSIS — M10.00 IDIOPATHIC GOUT: ICD-10-CM

## 2024-05-16 DIAGNOSIS — E78.00 PURE HYPERCHOLESTEROLEMIA: Primary | ICD-10-CM

## 2024-05-16 DIAGNOSIS — I10 ESSENTIAL HYPERTENSION, BENIGN: Primary | ICD-10-CM

## 2024-05-16 LAB
ALBUMIN SERPL BCP-MCNC: 4 G/DL (ref 3.5–5.2)
ALP SERPL-CCNC: 48 U/L (ref 55–135)
ALT SERPL W/O P-5'-P-CCNC: 48 U/L (ref 10–44)
ANION GAP SERPL CALC-SCNC: 6 MMOL/L (ref 8–16)
AST SERPL-CCNC: 41 U/L (ref 10–40)
BILIRUB DIRECT SERPL-MCNC: 0.2 MG/DL (ref 0.1–0.3)
BILIRUB SERPL-MCNC: 0.5 MG/DL (ref 0.1–1)
BUN SERPL-MCNC: 13 MG/DL (ref 8–23)
CALCIUM SERPL-MCNC: 9.7 MG/DL (ref 8.7–10.5)
CHLORIDE SERPL-SCNC: 108 MMOL/L (ref 95–110)
CHOLEST SERPL-MCNC: 156 MG/DL (ref 120–199)
CHOLEST/HDLC SERPL: 3.1 {RATIO} (ref 2–5)
CO2 SERPL-SCNC: 26 MMOL/L (ref 23–29)
CREAT SERPL-MCNC: 0.8 MG/DL (ref 0.5–1.4)
EST. GFR  (NO RACE VARIABLE): >60 ML/MIN/1.73 M^2
GLUCOSE SERPL-MCNC: 108 MG/DL (ref 70–110)
HDLC SERPL-MCNC: 51 MG/DL (ref 40–75)
HDLC SERPL: 32.7 % (ref 20–50)
LDLC SERPL CALC-MCNC: 87.8 MG/DL (ref 63–159)
NONHDLC SERPL-MCNC: 105 MG/DL
POTASSIUM SERPL-SCNC: 4.8 MMOL/L (ref 3.5–5.1)
PROT SERPL-MCNC: 6.7 G/DL (ref 6–8.4)
SODIUM SERPL-SCNC: 140 MMOL/L (ref 136–145)
TRIGL SERPL-MCNC: 86 MG/DL (ref 30–150)

## 2024-05-16 PROCEDURE — 80048 BASIC METABOLIC PNL TOTAL CA: CPT | Mod: HCNC | Performed by: INTERNAL MEDICINE

## 2024-05-16 PROCEDURE — 36415 COLL VENOUS BLD VENIPUNCTURE: CPT | Mod: HCNC,PO | Performed by: INTERNAL MEDICINE

## 2024-05-16 PROCEDURE — 80076 HEPATIC FUNCTION PANEL: CPT | Mod: HCNC | Performed by: INTERNAL MEDICINE

## 2024-05-16 PROCEDURE — 80061 LIPID PANEL: CPT | Mod: HCNC | Performed by: INTERNAL MEDICINE

## 2024-06-24 RX ORDER — COLCHICINE 0.6 MG/1
0.6 TABLET ORAL DAILY PRN
Qty: 30 TABLET | Refills: 11 | Status: SHIPPED | OUTPATIENT
Start: 2024-06-24

## 2024-06-24 RX ORDER — COLCHICINE 0.6 MG/1
0.6 TABLET ORAL DAILY PRN
Qty: 30 TABLET | Refills: 11 | Status: SHIPPED | OUTPATIENT
Start: 2024-06-24 | End: 2024-06-24 | Stop reason: SDUPTHER

## 2024-06-24 RX ORDER — COLCHICINE 0.6 MG/1
0.6 TABLET ORAL DAILY PRN
Qty: 30 TABLET | Refills: 11 | Status: SHIPPED | OUTPATIENT
Start: 2024-06-24 | End: 2024-06-24

## 2024-11-21 ENCOUNTER — LAB VISIT (OUTPATIENT)
Dept: LAB | Facility: HOSPITAL | Age: 64
End: 2024-11-21
Attending: INTERNAL MEDICINE
Payer: MEDICARE

## 2024-11-21 DIAGNOSIS — I10 ESSENTIAL HYPERTENSION, BENIGN: ICD-10-CM

## 2024-11-21 DIAGNOSIS — E78.2 MIXED HYPERLIPIDEMIA: ICD-10-CM

## 2024-11-21 DIAGNOSIS — E78.2 MIXED HYPERLIPIDEMIA: Primary | ICD-10-CM

## 2024-11-21 DIAGNOSIS — I49.3 VENTRICULAR PREMATURE BEATS: ICD-10-CM

## 2024-11-21 LAB
ALBUMIN SERPL BCP-MCNC: 4.1 G/DL (ref 3.5–5.2)
ALP SERPL-CCNC: 52 U/L (ref 40–150)
ALT SERPL W/O P-5'-P-CCNC: 53 U/L (ref 10–44)
ANION GAP SERPL CALC-SCNC: 9 MMOL/L (ref 8–16)
AST SERPL-CCNC: 46 U/L (ref 10–40)
BILIRUB DIRECT SERPL-MCNC: 0.3 MG/DL (ref 0.1–0.3)
BILIRUB SERPL-MCNC: 0.6 MG/DL (ref 0.1–1)
BUN SERPL-MCNC: 13 MG/DL (ref 8–23)
CALCIUM SERPL-MCNC: 9.8 MG/DL (ref 8.7–10.5)
CHLORIDE SERPL-SCNC: 109 MMOL/L (ref 95–110)
CHOLEST SERPL-MCNC: 144 MG/DL (ref 120–199)
CHOLEST/HDLC SERPL: 2.9 {RATIO} (ref 2–5)
CO2 SERPL-SCNC: 25 MMOL/L (ref 23–29)
CREAT SERPL-MCNC: 0.8 MG/DL (ref 0.5–1.4)
EST. GFR  (NO RACE VARIABLE): >60 ML/MIN/1.73 M^2
GLUCOSE SERPL-MCNC: 96 MG/DL (ref 70–110)
HDLC SERPL-MCNC: 50 MG/DL (ref 40–75)
HDLC SERPL: 34.7 % (ref 20–50)
LDLC SERPL CALC-MCNC: 64 MG/DL (ref 63–159)
NONHDLC SERPL-MCNC: 94 MG/DL
POTASSIUM SERPL-SCNC: 4.6 MMOL/L (ref 3.5–5.1)
PROT SERPL-MCNC: 6.9 G/DL (ref 6–8.4)
SODIUM SERPL-SCNC: 143 MMOL/L (ref 136–145)
TRIGL SERPL-MCNC: 150 MG/DL (ref 30–150)

## 2024-11-21 PROCEDURE — 80048 BASIC METABOLIC PNL TOTAL CA: CPT | Mod: HCNC

## 2024-11-21 PROCEDURE — 80076 HEPATIC FUNCTION PANEL: CPT | Mod: HCNC

## 2024-11-21 PROCEDURE — 36415 COLL VENOUS BLD VENIPUNCTURE: CPT | Mod: HCNC,PO

## 2024-11-21 PROCEDURE — 80061 LIPID PANEL: CPT | Mod: HCNC

## 2025-06-17 ENCOUNTER — LAB VISIT (OUTPATIENT)
Dept: LAB | Facility: HOSPITAL | Age: 65
End: 2025-06-17
Attending: INTERNAL MEDICINE
Payer: MEDICARE

## 2025-06-17 DIAGNOSIS — E78.2 MIXED HYPERLIPIDEMIA: Primary | ICD-10-CM

## 2025-06-17 LAB
ALBUMIN SERPL BCP-MCNC: 4.3 G/DL (ref 3.5–5.2)
ALP SERPL-CCNC: 55 UNIT/L (ref 40–150)
ALT SERPL W/O P-5'-P-CCNC: 34 UNIT/L (ref 10–44)
ANION GAP (OHS): 8 MMOL/L (ref 8–16)
AST SERPL-CCNC: 31 UNIT/L (ref 11–45)
BILIRUB DIRECT SERPL-MCNC: 0.2 MG/DL (ref 0.1–0.3)
BILIRUB SERPL-MCNC: 0.6 MG/DL (ref 0.1–1)
BUN SERPL-MCNC: 16 MG/DL (ref 8–23)
CALCIUM SERPL-MCNC: 9.6 MG/DL (ref 8.7–10.5)
CHLORIDE SERPL-SCNC: 104 MMOL/L (ref 95–110)
CHOLEST SERPL-MCNC: 174 MG/DL (ref 120–199)
CHOLEST/HDLC SERPL: 3.2 {RATIO} (ref 2–5)
CO2 SERPL-SCNC: 27 MMOL/L (ref 23–29)
CREAT SERPL-MCNC: 0.8 MG/DL (ref 0.5–1.4)
GFR SERPLBLD CREATININE-BSD FMLA CKD-EPI: >60 ML/MIN/1.73/M2
GLUCOSE SERPL-MCNC: 90 MG/DL (ref 70–110)
HDLC SERPL-MCNC: 54 MG/DL (ref 40–75)
HDLC SERPL: 31 % (ref 20–50)
LDLC SERPL CALC-MCNC: 87.8 MG/DL (ref 63–159)
NONHDLC SERPL-MCNC: 120 MG/DL
POTASSIUM SERPL-SCNC: 4.8 MMOL/L (ref 3.5–5.1)
PROT SERPL-MCNC: 7 GM/DL (ref 6–8.4)
SODIUM SERPL-SCNC: 139 MMOL/L (ref 136–145)
TRIGL SERPL-MCNC: 161 MG/DL (ref 30–150)

## 2025-06-17 PROCEDURE — 82947 ASSAY GLUCOSE BLOOD QUANT: CPT | Mod: HCNC

## 2025-06-17 PROCEDURE — 82465 ASSAY BLD/SERUM CHOLESTEROL: CPT | Mod: HCNC

## 2025-06-17 PROCEDURE — 36415 COLL VENOUS BLD VENIPUNCTURE: CPT | Mod: HCNC,PO

## 2025-06-17 PROCEDURE — 82248 BILIRUBIN DIRECT: CPT | Mod: HCNC

## 2025-08-05 ENCOUNTER — OFFICE VISIT (OUTPATIENT)
Dept: OPTOMETRY | Facility: CLINIC | Age: 65
End: 2025-08-05
Payer: COMMERCIAL

## 2025-08-05 DIAGNOSIS — H52.203 MYOPIA WITH ASTIGMATISM AND PRESBYOPIA, BILATERAL: ICD-10-CM

## 2025-08-05 DIAGNOSIS — H52.13 MYOPIA WITH ASTIGMATISM AND PRESBYOPIA, BILATERAL: ICD-10-CM

## 2025-08-05 DIAGNOSIS — Z01.00 EYE EXAM, ROUTINE: Primary | ICD-10-CM

## 2025-08-05 DIAGNOSIS — H52.4 MYOPIA WITH ASTIGMATISM AND PRESBYOPIA, BILATERAL: ICD-10-CM

## 2025-08-05 PROCEDURE — 99999 PR PBB SHADOW E&M-EST. PATIENT-LVL II: CPT | Mod: PBBFAC,HCNC,, | Performed by: OPTOMETRIST

## 2025-08-05 PROCEDURE — 92014 COMPRE OPH EXAM EST PT 1/>: CPT | Mod: HCNC,S$GLB,, | Performed by: OPTOMETRIST

## 2025-08-05 PROCEDURE — 92015 DETERMINE REFRACTIVE STATE: CPT | Mod: HCNC,S$GLB,, | Performed by: OPTOMETRIST

## 2025-08-05 NOTE — PROGRESS NOTES
HPI    [place] eye exam     Eye meds: none    65 year old male states vision is stable with current MRX, denies needing   updated MRX.   States he struggles to see at night due to glare and bright lights.   Denies flashes, floaters or diplopia. Denies ocular pain   Last edited by Eliu Mancini, OD on 8/5/2025  8:27 AM.            Assessment /Plan     For exam results, see Encounter Report.    Eye exam, routine  -[facility]  -Educated patient on presence of cataracts at today's exam, monitor at annual dilated fundus exam. 5+ years surgical estimate.    Myopia with astigmatism and presbyopia, bilateral  Eyeglass Final Rx       Eyeglass Final Rx         Sphere Cylinder Axis Dist VA    Right -0.75 +1.25 160 20/25    Left [place] +0.75 005 20/25      Type: SVL    Expiration Date: 8/5/2026              Eyeglass Final Rx #2         Sphere Cylinder Axis Dist VA    Right +1.75 +1.25 160     Left +2.50 +0.75 005       Type: near    Expiration Date: 8/5/2026                      RTC 1 yr